# Patient Record
Sex: FEMALE | URBAN - METROPOLITAN AREA
[De-identification: names, ages, dates, MRNs, and addresses within clinical notes are randomized per-mention and may not be internally consistent; named-entity substitution may affect disease eponyms.]

---

## 2024-09-11 ENCOUNTER — APPOINTMENT (RX ONLY)
Dept: URBAN - METROPOLITAN AREA CLINIC 57 | Facility: CLINIC | Age: 39
Setting detail: DERMATOLOGY
End: 2024-09-11

## 2024-09-11 DIAGNOSIS — R21 RASH AND OTHER NONSPECIFIC SKIN ERUPTION: ICD-10-CM

## 2024-09-11 DIAGNOSIS — L30.9 DERMATITIS, UNSPECIFIED: ICD-10-CM

## 2024-09-11 PROCEDURE — ? OTHER

## 2024-09-11 PROCEDURE — 99204 OFFICE O/P NEW MOD 45 MIN: CPT

## 2024-09-11 PROCEDURE — ? PRESCRIPTION

## 2024-09-11 PROCEDURE — ? REFERRAL CORRESPONDENCE

## 2024-09-11 PROCEDURE — ? COUNSELING

## 2024-09-11 PROCEDURE — ? DEFER

## 2024-09-11 PROCEDURE — ? TREATMENT REGIMEN

## 2024-09-11 RX ORDER — CLOBETASOL PROPIONATE 0.5 MG/G
CREAM TOPICAL BID
Qty: 45 | Refills: 2 | Status: ERX | COMMUNITY
Start: 2024-09-11

## 2024-09-11 RX ORDER — TACROLIMUS 1 MG/G
OINTMENT TOPICAL BID
Qty: 30 | Refills: 2 | Status: ERX | COMMUNITY
Start: 2024-09-11

## 2024-09-11 RX ADMIN — CLOBETASOL PROPIONATE: 0.5 CREAM TOPICAL at 00:00

## 2024-09-11 RX ADMIN — TACROLIMUS: 1 OINTMENT TOPICAL at 00:00

## 2024-09-11 NOTE — PROCEDURE: TREATMENT REGIMEN
Initiate Treatment: clobetasol 0.05 % topical cream \\nSig: Apply to discoloration on affected areas of the lower legs BID x2 weeks on then x2 weeks off. Alternate with tacrolimus.\\n\\ntacrolimus 0.1 % topical ointment\\nSig: Apply one gram  to affected areas on the lower legs BID x2 weeks on, then x2 weeks off, repeat. Alternate w/ Clobetasol
Detail Level: Detailed

## 2024-09-11 NOTE — PROCEDURE: OTHER
Other (Free Text): Pt states she hit her leg while at work and she got bruised then she got discoloration on both of her lower legs. Pt has no SE’s besides discoloration. Pt did BW and it was good. Recommended referral to hematologist. AS PER PT SHE HAS ITP AND THIS WAS MENTIONED TO HER BY HER MOM. PT HASN'T BEEN SEEING A HEMATOLOGIST. 
Detail Level: Zone
Note Text (......Xxx Chief Complaint.): This diagnosis correlates with the
Render Risk Assessment In Note?: no
Other (Free Text): Will do Bx’s if really necessary

## 2024-09-11 NOTE — PROCEDURE: DEFER
Instructions (Optional): TBD- 3mm punch r/o stasis dermatitis vs tinea vs ecchymosis vs contact dermatitis vs other *PAS* (x2)
Introduction Text (Please End With A Colon): *
Procedure To Be Performed At Next Visit: Biopsy by punch method
X Size Of Lesion In Cm (Optional): 0
Detail Level: Detailed